# Patient Record
Sex: MALE | Race: AMERICAN INDIAN OR ALASKA NATIVE
[De-identification: names, ages, dates, MRNs, and addresses within clinical notes are randomized per-mention and may not be internally consistent; named-entity substitution may affect disease eponyms.]

---

## 2022-09-08 ENCOUNTER — HOSPITAL ENCOUNTER (INPATIENT)
Dept: HOSPITAL 5 - ED | Age: 32
LOS: 2 days | Discharge: HOME | DRG: 91 | End: 2022-09-10
Attending: STUDENT IN AN ORGANIZED HEALTH CARE EDUCATION/TRAINING PROGRAM | Admitting: HOSPITALIST
Payer: SELF-PAY

## 2022-09-08 DIAGNOSIS — F14.10: ICD-10-CM

## 2022-09-08 DIAGNOSIS — G92.8: Primary | ICD-10-CM

## 2022-09-08 DIAGNOSIS — E44.1: ICD-10-CM

## 2022-09-08 DIAGNOSIS — N17.0: ICD-10-CM

## 2022-09-08 LAB
ALBUMIN SERPL-MCNC: 3.5 G/DL (ref 3.9–5)
ALT SERPL-CCNC: 113 UNITS/L (ref 7–56)
BASOPHILS # (AUTO): 0 K/MM3 (ref 0–0.1)
BASOPHILS NFR BLD AUTO: 0.3 % (ref 0–1.8)
BUN SERPL-MCNC: 18 MG/DL (ref 9–20)
BUN/CREAT SERPL: 13 %
CALCIUM SERPL-MCNC: 8.4 MG/DL (ref 8.4–10.2)
EOSINOPHIL # BLD AUTO: 0 K/MM3 (ref 0–0.4)
EOSINOPHIL NFR BLD AUTO: 0 % (ref 0–4.3)
HCT VFR BLD CALC: 40.6 % (ref 35.5–45.6)
HEMOLYSIS INDEX: 29
HGB BLD-MCNC: 13.8 GM/DL (ref 11.8–15.2)
LYMPHOCYTES # BLD AUTO: 0.9 K/MM3 (ref 1.2–5.4)
LYMPHOCYTES NFR BLD AUTO: 4.9 % (ref 13.4–35)
MCHC RBC AUTO-ENTMCNC: 34 % (ref 32–34)
MCV RBC AUTO: 91 FL (ref 84–94)
MONOCYTES # (AUTO): 1.3 K/MM3 (ref 0–0.8)
MONOCYTES % (AUTO): 7.2 % (ref 0–7.3)
PLATELET # BLD: 138 K/MM3 (ref 140–440)
RBC # BLD AUTO: 4.47 M/MM3 (ref 3.65–5.03)

## 2022-09-08 PROCEDURE — 62270 DX LMBR SPI PNXR: CPT

## 2022-09-08 PROCEDURE — 70544 MR ANGIOGRAPHY HEAD W/O DYE: CPT

## 2022-09-08 PROCEDURE — 70551 MRI BRAIN STEM W/O DYE: CPT

## 2022-09-08 PROCEDURE — 82140 ASSAY OF AMMONIA: CPT

## 2022-09-08 PROCEDURE — 36415 COLL VENOUS BLD VENIPUNCTURE: CPT

## 2022-09-08 PROCEDURE — 85610 PROTHROMBIN TIME: CPT

## 2022-09-08 PROCEDURE — 96374 THER/PROPH/DIAG INJ IV PUSH: CPT

## 2022-09-08 PROCEDURE — 80053 COMPREHEN METABOLIC PANEL: CPT

## 2022-09-08 PROCEDURE — 82962 GLUCOSE BLOOD TEST: CPT

## 2022-09-08 PROCEDURE — 89051 BODY FLUID CELL COUNT: CPT

## 2022-09-08 PROCEDURE — 62328 DX LMBR SPI PNXR W/FLUOR/CT: CPT

## 2022-09-08 PROCEDURE — 87040 BLOOD CULTURE FOR BACTERIA: CPT

## 2022-09-08 PROCEDURE — G0378 HOSPITAL OBSERVATION PER HR: HCPCS

## 2022-09-08 PROCEDURE — 80061 LIPID PANEL: CPT

## 2022-09-08 PROCEDURE — 80048 BASIC METABOLIC PNL TOTAL CA: CPT

## 2022-09-08 PROCEDURE — 85025 COMPLETE CBC W/AUTO DIFF WBC: CPT

## 2022-09-08 PROCEDURE — 70450 CT HEAD/BRAIN W/O DYE: CPT

## 2022-09-08 PROCEDURE — 82947 ASSAY GLUCOSE BLOOD QUANT: CPT

## 2022-09-08 PROCEDURE — 99285 EMERGENCY DEPT VISIT HI MDM: CPT

## 2022-09-08 PROCEDURE — 84160 ASSAY OF PROTEIN ANY SOURCE: CPT

## 2022-09-08 PROCEDURE — 87116 MYCOBACTERIA CULTURE: CPT

## 2022-09-08 NOTE — EMERGENCY DEPARTMENT REPORT
ED General Adult HPI





- General


Chief complaint: Overdose


Stated complaint: OD


Time Seen by Provider: 09/08/22 16:29


Source: patient, EMS


Mode of arrival: Stretcher


Limitations: No Limitations





- History of Present Illness


Initial comments: 





Patient presents emergency department via EMS for unresponsiveness.  The patient

was found unresponsive by bystanders.  EMS gave the patient 0.4 mg of Narcan and

the patient was arousable.  Patient states that he believes his cocaine was 

mixed with something.


-: unknown


Severity scale (0 -10): 0


Consistency: now resolved


Improves with: none


Worsens with: none


Associated Symptoms: denies other symptoms


Treatments Prior to Arrival: none





- Related Data


                                    Allergies











Allergy/AdvReac Type Severity Reaction Status Date / Time


 


No Known Allergies Allergy   Unverified 09/08/22 16:23














ED Review of Systems


ROS: 


Stated complaint: OD


Other details as noted in HPI





Constitutional: denies: chills, fever


Eyes: denies: eye pain, eye discharge, vision change


ENT: denies: ear pain, throat pain


Respiratory: denies: cough, shortness of breath, wheezing


Cardiovascular: denies: chest pain, palpitations


Endocrine: no symptoms reported


Gastrointestinal: denies: abdominal pain, nausea, diarrhea


Genitourinary: denies: urgency, dysuria


Musculoskeletal: denies: back pain, joint swelling, arthralgia


Skin: denies: rash, lesions


Neurological: denies: headache, weakness, paresthesias


Psychiatric: denies: anxiety, depression


Hematological/Lymphatic: denies: easy bleeding, easy bruising





ED Physical Exam





- General


Limitations: No Limitations


General appearance: in no apparent distress, other (Somnolent but easily 

arousable)





- Head


Head exam: Present: atraumatic, normocephalic





- Eye


Eye exam: Present: normal appearance





- ENT


ENT exam: Present: mucous membranes moist





- Neck


Neck exam: Present: normal inspection





- Respiratory


Respiratory exam: Present: normal lung sounds bilaterally.  Absent: respiratory 

distress





- Cardiovascular


Cardiovascular Exam: Present: regular rate, normal rhythm.  Absent: systolic 

murmur, diastolic murmur, rubs, gallop





- GI/Abdominal


GI/Abdominal exam: Present: soft, normal bowel sounds.  Absent: distended, 

tenderness





- Rectal


Rectal exam: Present: deferred





- Extremities Exam


Extremities exam: Present: normal inspection





- Back Exam


Back exam: Present: normal inspection





- Neurological Exam


Neurological exam: Present: alert, oriented X3, CN II-XII intact, other (13/15).

 Absent: motor sensory deficit





- Psychiatric


Psychiatric exam: Present: normal affect, normal mood





- Skin


Skin exam: Present: warm, dry, intact, normal color.  Absent: rash





ED Course


                                   Vital Signs











  09/08/22 09/08/22 09/08/22





  16:13 16:41 16:45


 


Temperature 98.2 F  


 


Pulse Rate 95 H  84


 


Respiratory 14  11 L





Rate   


 


Blood Pressure   


 


Blood Pressure 116/79  





[Left]   


 


O2 Sat by Pulse 98 96 95





Oximetry   














  09/08/22 09/08/22 09/08/22





  17:01 17:15 17:31


 


Temperature   


 


Pulse Rate 88 84 86


 


Respiratory 15 8 L 9 L





Rate   


 


Blood Pressure  98/58 98/58


 


Blood Pressure   





[Left]   


 


O2 Sat by Pulse 95 96 96





Oximetry   














  09/08/22 09/08/22 09/08/22





  17:45 18:01 18:15


 


Temperature   


 


Pulse Rate 85 86 91 H


 


Respiratory 12 10 L 12





Rate   


 


Blood Pressure 103/61 103/61 104/63


 


Blood Pressure   





[Left]   


 


O2 Sat by Pulse 96 95 98





Oximetry   














  09/08/22 09/08/22 09/08/22





  18:31 18:45 19:00


 


Temperature   


 


Pulse Rate 81 84 85


 


Respiratory 14 13 14





Rate   


 


Blood Pressure 104/63 106/70 104/63


 


Blood Pressure   





[Left]   


 


O2 Sat by Pulse 100 97 99





Oximetry   














  09/08/22 09/08/22 09/08/22





  19:15 19:31 19:45


 


Temperature   


 


Pulse Rate 82 82 82


 


Respiratory 11 L 12 11 L





Rate   


 


Blood Pressure 101/63 101/63 110/63


 


Blood Pressure   





[Left]   


 


O2 Sat by Pulse 96 95 95





Oximetry   














  09/08/22 09/08/22





  20:01 20:15


 


Temperature  


 


Pulse Rate 82 79


 


Respiratory 18 11 L





Rate  


 


Blood Pressure 110/63 


 


Blood Pressure  





[Left]  


 


O2 Sat by Pulse 97 95





Oximetry  














ED Medical Decision Making





- Lab Data


Result diagrams: 


                                 09/08/22 19:56





                                 09/08/22 19:56








                                   Lab Results











  09/08/22 09/08/22 Range/Units





  19:56 19:56 


 


WBC  18.5 H   (4.5-11.0)  K/mm3


 


RBC  4.47   (3.65-5.03)  M/mm3


 


Hgb  13.8   (11.8-15.2)  gm/dl


 


Hct  40.6   (35.5-45.6)  %


 


MCV  91   (84-94)  fl


 


MCH  31   (28-32)  pg


 


MCHC  34   (32-34)  %


 


RDW  12.4 L   (13.2-15.2)  %


 


Plt Count  138 L   (140-440)  K/mm3


 


Lymph % (Auto)  4.9 L   (13.4-35.0)  %


 


Mono % (Auto)  7.2   (0.0-7.3)  %


 


Eos % (Auto)  0.0   (0.0-4.3)  %


 


Baso % (Auto)  0.3   (0.0-1.8)  %


 


Lymph # (Auto)  0.9 L   (1.2-5.4)  K/mm3


 


Mono # (Auto)  1.3 H   (0.0-0.8)  K/mm3


 


Eos # (Auto)  0.0   (0.0-0.4)  K/mm3


 


Baso # (Auto)  0.0   (0.0-0.1)  K/mm3


 


Seg Neutrophils %  87.6 H   (40.0-70.0)  %


 


Seg Neutrophils #  16.2 H   (1.8-7.7)  K/mm3


 


Sodium   138  (137-145)  mmol/L


 


Potassium   4.6  (3.6-5.0)  mmol/L


 


Chloride   102.9  ()  mmol/L


 


Carbon Dioxide   23  (22-30)  mmol/L


 


Anion Gap   17  mmol/L


 


BUN   18  (9-20)  mg/dL


 


Creatinine   1.4 H  (0.8-1.3)  mg/dL


 


Estimated GFR   > 60  ml/min


 


BUN/Creatinine Ratio   13  %


 


Glucose   108 H  ()  mg/dL


 


Calcium   8.4  (8.4-10.2)  mg/dL


 


Total Bilirubin   1.10  (0.1-1.2)  mg/dL


 


AST   219 H  (5-40)  units/L


 


ALT   113 H  (7-56)  units/L


 


Alkaline Phosphatase   71  ()  units/L


 


Total Protein   5.3 L  (6.3-8.2)  g/dL


 


Albumin   3.5 L  (3.9-5)  g/dL


 


Albumin/Globulin Ratio   1.9  %














- Radiology Data


Radiology results: report reviewed





- Medical Decision Making





Spoke to the neuro intensivist at [facility], Dr. Kamara, and the patient CT was 

discussed in detail


So that is less likely that the patient has an anoxic injury due to his 

stability to correspond upon awakening


Discussed doing a infectious work-up and to admit the patient to our facility 

for MRI with and without contrast


The above-mentioned neurochecks was felt the patient did not need to be acutely 

transferred out to their facility


Spoke to Dr. Cortez but the patient and the plan of care


Patient will be admitted to the hospitalist with MRI planned for the morning


Patient will also get lumbar puncture via IR in the a.m.


Reevaluation of the patient at 10:05 PM he is alert oriented x3 with a GCS of 

14/15


Critical care attestation.: 


If time is entered above; I have spent that time in minutes in the direct care 

of this critically ill patient, excluding procedure time.








ED Disposition


Clinical Impression: 


 AMS (altered mental status)





Disposition: 09 ADMITTED AS INPATIENT


Is pt being admited?: Yes


Does the pt Need Aspirin: No


Condition: Fair

## 2022-09-08 NOTE — HISTORY AND PHYSICAL REPORT
History of Present Illness


Date of examination: 09/08/22


Date of admission: 


09/08/22


Chief complaint: 





Altered mental status


Drug overdose


History of present illness: 





31 years old male with history of cocaine abuse was brought to the emergency 

department via EMS for unresponsiveness.  The patient was found unresponsive by 

bystanders.  EMS gave the patient 0.4 mg of Narcan and the patient was 

arousable.  Patient states that he believes his cocaine was mixed with so

mething.





In the emergency room initial CT scan of the head shows decreased attenuation 

seen in the right globus pallidus regions bilaterally.  Findings are suggestive 

of an anoxic episode.  Subsequently ER physician Spoke to the neuro intensivist 

at [facility], Dr. Kamara, and the patient CT was discussed in detail


So that is less likely that the patient has an anoxic injury due to his 

stability to correspond upon awakening


Discussed doing a infectious work-up and to admit the patient to our facility 

for MRI with and without contrast


The above-mentioned neurochecks was felt the patient did not need to be acutely 

transferred out to their facility


Spoke to Dr. Cortez but the patient and the plan of care


Patient will be admitted to the hospitalist with MRI planned for the morning


Patient will also get lumbar puncture via IR in the a.m.


Reevaluation of the patient at 10:05 PM he is alert oriented x3 with a GCS of 

14/15





Past History


Past Medical History: other


Past Surgical History: No surgical history (Cocaine abuse)


Social history: other (Cocaine abuse)


Family history: no significant family history





Medications and Allergies


                                    Allergies











Allergy/AdvReac Type Severity Reaction Status Date / Time


 


No Known Allergies Allergy   Verified 09/08/22 22:24











Active Meds: 


Active Medications





Acetaminophen (Acetaminophen 325 Mg Tab)  650 mg PO Q4H PRN


   PRN Reason: Pain MILD(1-3)/Fever >100.5/HA


Ibuprofen (Ibuprofen 600 Mg Tab)  600 mg PO Q6H PRN


   PRN Reason: Pain, Mild (1-3)


Morphine Sulfate (Morphine 2 Mg/1 Ml Inj)  2 mg IV Q4H PRN


   PRN Reason: Pain, Moderate (4-6)


Ondansetron HCl (Ondansetron 4 Mg/2 Ml Inj)  4 mg IV Q8H PRN


   PRN Reason: Nausea And Vomiting


Sodium Chloride (Sodium Chloride 0.9% 10 Ml Flush Syringe)  10 ml IV BID CHARANJIT


Sodium Chloride (Sodium Chloride 0.9% 10 Ml Flush Syringe)  10 ml IV PRN PRN


   PRN Reason: LINE FLUSH











Review of Systems


All systems: negative


Constitutional: fatigue, malaise, lethargy





Exam





- Constitutional


Vitals: 


                                        











Temp Pulse Resp BP Pulse Ox


 


 98.2 F   79   11 L  110/63   95 


 


 09/08/22 16:13  09/08/22 20:15  09/08/22 20:15  09/08/22 20:01  09/08/22 20:15











General appearance: Present: no acute distress, well-nourished





- EENT


Eyes: Present: PERRL


ENT: hearing intact, clear oral mucosa





- Neck


Neck: Present: supple, normal ROM





- Respiratory


Respiratory effort: normal


Respiratory: bilateral: CTA





- Cardiovascular


Heart Sounds: Present: S1 & S2.  Absent: rub, click





- Extremities


Extremities: pulses symmetrical, No edema


Peripheral Pulses: within normal limits





- Abdominal


General gastrointestinal: Present: soft, non-tender, non-distended, normal bowel

sounds


Male genitourinary: Present: normal





- Integumentary


Integumentary: Present: clear, warm, dry





- Musculoskeletal


Musculoskeletal: gait normal, strength equal bilaterally





- Psychiatric


Psychiatric: appropriate mood/affect, intact judgment & insight





- Neurologic


Neurologic: CNII-XII intact, moves all extremities





Results





- Labs


CBC & Chem 7: 


                                 09/08/22 19:56





                                 09/08/22 19:56


Labs: 


                             Laboratory Last Values











WBC  18.5 K/mm3 (4.5-11.0)  H  09/08/22  19:56    


 


RBC  4.47 M/mm3 (3.65-5.03)   09/08/22  19:56    


 


Hgb  13.8 gm/dl (11.8-15.2)   09/08/22  19:56    


 


Hct  40.6 % (35.5-45.6)   09/08/22  19:56    


 


MCV  91 fl (84-94)   09/08/22  19:56    


 


MCH  31 pg (28-32)   09/08/22  19:56    


 


MCHC  34 % (32-34)   09/08/22  19:56    


 


RDW  12.4 % (13.2-15.2)  L  09/08/22  19:56    


 


Plt Count  138 K/mm3 (140-440)  L  09/08/22  19:56    


 


Lymph % (Auto)  4.9 % (13.4-35.0)  L  09/08/22  19:56    


 


Mono % (Auto)  7.2 % (0.0-7.3)   09/08/22  19:56    


 


Eos % (Auto)  0.0 % (0.0-4.3)   09/08/22  19:56    


 


Baso % (Auto)  0.3 % (0.0-1.8)   09/08/22  19:56    


 


Lymph # (Auto)  0.9 K/mm3 (1.2-5.4)  L  09/08/22  19:56    


 


Mono # (Auto)  1.3 K/mm3 (0.0-0.8)  H  09/08/22  19:56    


 


Eos # (Auto)  0.0 K/mm3 (0.0-0.4)   09/08/22  19:56    


 


Baso # (Auto)  0.0 K/mm3 (0.0-0.1)   09/08/22  19:56    


 


Seg Neutrophils %  87.6 % (40.0-70.0)  H  09/08/22  19:56    


 


Seg Neutrophils #  16.2 K/mm3 (1.8-7.7)  H  09/08/22  19:56    


 


Sodium  138 mmol/L (137-145)   09/08/22  19:56    


 


Potassium  4.6 mmol/L (3.6-5.0)   09/08/22  19:56    


 


Chloride  102.9 mmol/L ()   09/08/22  19:56    


 


Carbon Dioxide  23 mmol/L (22-30)   09/08/22  19:56    


 


Anion Gap  17 mmol/L  09/08/22  19:56    


 


BUN  18 mg/dL (9-20)   09/08/22  19:56    


 


Creatinine  1.4 mg/dL (0.8-1.3)  H  09/08/22  19:56    


 


Estimated GFR  > 60 ml/min  09/08/22  19:56    


 


BUN/Creatinine Ratio  13 %  09/08/22  19:56    


 


Glucose  108 mg/dL ()  H  09/08/22  19:56    


 


Lactic Acid  1.40 mmol/L (0.7-2.0)   09/08/22  21:33    


 


Calcium  8.4 mg/dL (8.4-10.2)   09/08/22  19:56    


 


Total Bilirubin  1.10 mg/dL (0.1-1.2)   09/08/22  19:56    


 


AST  219 units/L (5-40)  H  09/08/22  19:56    


 


ALT  113 units/L (7-56)  H  09/08/22  19:56    


 


Alkaline Phosphatase  71 units/L ()   09/08/22  19:56    


 


Total Protein  5.3 g/dL (6.3-8.2)  L  09/08/22  19:56    


 


Albumin  3.5 g/dL (3.9-5)  L  09/08/22  19:56    


 


Albumin/Globulin Ratio  1.9 %  09/08/22  19:56    














- Imaging and Cardiology


CT Scan - head: report reviewed





Assessment and Plan


VTE prophylaxis?: Mechanical


Plan of care discussed with patient/family: Yes





- Patient Problems


(1) Acute metabolic encephalopathy


Status: Acute   


Plan to address problem: 


Admit the patient to the medical telemetry.  NPO.  D5 half-normal saline at the 

rate of 100 cc/h.  Oxygen via nasal penetrator per minute.  We will do MRI of 

the brain and MRA of the brain and neck with and without contrast.  Neurology 

and critical care evaluation.  We also do LP in the morning as per ER physician.








(2) Anoxic brain injury


Status: Acute   


Plan to address problem: 


Oxygen via nasal penetrator per minute.  We will do MRI of the brain and MRA of 

the brain and neck with and without contrast.  Neurology and critical care 

evaluation.  We also do LP in the morning as per ER physician.








(3) Cocaine abuse


Status: Acute   


Plan to address problem: 


Patient counseled regarding quit taking drugs and cocaine.








(4) DVT prophylaxis


Status: Acute   


Plan to address problem: 


SCD for DVT prophylaxis.  Pepcid 20 mg IV twice a day for GI prophylaxis.  

Patient is a full code

## 2022-09-08 NOTE — EMERGENCY DEPARTMENT REPORT
ED General Adult HPI





- General


Chief complaint: Overdose


Stated complaint: OD


Time Seen by Provider: 09/08/22 16:29


Source: patient, EMS


Mode of arrival: Stretcher


Limitations: No Limitations





- History of Present Illness


Severity scale (0 -10): 0





- Related Data


                                    Allergies











Allergy/AdvReac Type Severity Reaction Status Date / Time


 


No Known Allergies Allergy   Unverified 09/08/22 16:23














ED Review of Systems


ROS: 


Stated complaint: OD


Other details as noted in HPI





Comment: All other systems reviewed and negative


Constitutional: denies: chills, fever


Eyes: denies: eye pain, eye discharge, vision change


ENT: denies: ear pain, throat pain


Respiratory: denies: cough, shortness of breath, wheezing


Cardiovascular: denies: chest pain, palpitations


Endocrine: no symptoms reported


Gastrointestinal: denies: abdominal pain, nausea, diarrhea


Genitourinary: denies: urgency, dysuria


Musculoskeletal: denies: back pain, joint swelling, arthralgia


Skin: denies: rash, lesions


Neurological: denies: headache, weakness, paresthesias


Psychiatric: denies: anxiety, depression


Hematological/Lymphatic: denies: easy bleeding, easy bruising





ED Physical Exam





- General


Limitations: No Limitations





ED Course





                                   Vital Signs











  09/08/22





  16:13


 


Temperature 98.2 F


 


Pulse Rate 95 H


 


Respiratory 14





Rate 


 


Blood Pressure 116/79





[Left] 


 


O2 Sat by Pulse 98





Oximetry 











Critical care attestation.: 


If time is entered above; I have spent that time in minutes in the direct care 

of this critically ill patient, excluding procedure time.








ED Disposition


Condition: Stable

## 2022-09-08 NOTE — CAT SCAN REPORT
CT head/brain wo con



INDICATION / CLINICAL INFORMATION:

31 years Male; unresponsive.



TECHNIQUE: Routine CT head without contrast. All CT scans at this location are performed using CT dos
e reduction for ALARA by means of automated exposure control.



COMPARISON: 

None.



FINDINGS:



BRAIN / INTRACRANIAL CONTENTS: Decreased attenuation seen in the globus pallidus regions bilaterally.
 Findings are suggestive of anoxic episode. Please clinically correlate.



 Otherwise, no acute hemorrhage, mass effect, midline shift, hydrocephalus, or acute, large territori
al infarct. No signs of significant atrophy or chronic infarct. No significant white matter abnormali
ty seen.



CRANIOCERVICAL JUNCTION: No significant abnormality.



ORBITS: No significant abnormality of visualized orbits.

SINUSES / MASTOIDS: Small air-fluid levels seen in both maxillary antra. Mild to moderate mucosal thi
ckening seen in the ethmoids.



ADDITIONAL FINDINGS: None. 



IMPRESSION:

1. Findings worrisome for anoxic episode, as described above.

2. Otherwise, no focal mass, hemorrhage, hydrocephalus, or acute, large infarct seen.

3. Sinus disease noted.



Signer Name: Sony Sullivan MD, III 

Signed: 9/8/2022 7:38 PM

[facility]station Name[email or web address]

## 2022-09-09 LAB
BASOPHILS # (AUTO): 0 K/MM3 (ref 0–0.1)
BASOPHILS NFR BLD AUTO: 0.1 % (ref 0–1.8)
BUN SERPL-MCNC: 14 MG/DL (ref 9–20)
BUN/CREAT SERPL: 13 %
CALCIUM SERPL-MCNC: 8.2 MG/DL (ref 8.4–10.2)
EOSINOPHIL # BLD AUTO: 0 K/MM3 (ref 0–0.4)
EOSINOPHIL NFR BLD AUTO: 0.1 % (ref 0–4.3)
GLUCOSE CSF-MCNC: 69 MG/DL
HCT VFR BLD CALC: 37.8 % (ref 35.5–45.6)
HDLC SERPL-MCNC: 43 MG/DL (ref 40–59)
HEMOLYSIS INDEX: 10
HGB BLD-MCNC: 12.9 GM/DL (ref 11.8–15.2)
INR PPP: 0.95 (ref 0.87–1.13)
LYMPHOCYTES # BLD AUTO: 0.8 K/MM3 (ref 1.2–5.4)
LYMPHOCYTES NFR BLD AUTO: 7.3 % (ref 13.4–35)
MCHC RBC AUTO-ENTMCNC: 34 % (ref 32–34)
MCV RBC AUTO: 92 FL (ref 84–94)
MONOCYTES # (AUTO): 0.8 K/MM3 (ref 0–0.8)
MONOCYTES % (AUTO): 7.6 % (ref 0–7.3)
PLATELET # BLD: 131 K/MM3 (ref 140–440)
RBC # BLD AUTO: 4.12 M/MM3 (ref 3.65–5.03)
RED BLOOD CELL,CSF: 0 /MM3 (ref 0–0)
TOTAL CELLS COUNTED: 3 /MM3
WBC # CSF: 0 /MM3 (ref 1–10)

## 2022-09-09 PROCEDURE — 009U3ZX DRAINAGE OF SPINAL CANAL, PERCUTANEOUS APPROACH, DIAGNOSTIC: ICD-10-PCS

## 2022-09-09 PROCEDURE — B01B1ZZ FLUOROSCOPY OF SPINAL CORD USING LOW OSMOLAR CONTRAST: ICD-10-PCS

## 2022-09-09 RX ADMIN — FAMOTIDINE SCH MG: 10 INJECTION, SOLUTION INTRAVENOUS at 22:53

## 2022-09-09 RX ADMIN — FAMOTIDINE SCH MG: 10 INJECTION, SOLUTION INTRAVENOUS at 12:50

## 2022-09-09 RX ADMIN — ASPIRIN SCH MG: 325 TABLET ORAL at 12:51

## 2022-09-09 RX ADMIN — Medication SCH ML: at 12:51

## 2022-09-09 RX ADMIN — Medication SCH ML: at 22:54

## 2022-09-09 NOTE — CONSULTATION
History of Present Illness


Consult date: 09/09/22


Reason for Consult: AMS


Chief complaint: 





AMS


History of present illness: 





30 yo male with substance abuse, noted with acute encephalopathy / unresponsive 

and found by bystanders.  He was given narcan and noted to be arousable.  He 

thinks the cocaine was laced.  Noted on admission nchct with bilateral globus 

pallidus hypodensities, which does not match his exam.





**Patient is not in the room during rounds but is at MRI.





Past History


Past Medical History: other (substance abuse)


Past Surgical History: No surgical history (Cocaine abuse)


Social history: other (Cocaine abuse)


Family history: no significant family history





Medications and Allergies


                                    Allergies











Allergy/AdvReac Type Severity Reaction Status Date / Time


 


No Known Allergies Allergy   Verified 09/08/22 22:24











                                Home Medications











 Medication  Instructions  Recorded  Confirmed  Last Taken  Type


 


No Known Home Medications [No  09/09/22 09/09/22 Unknown History





Reported Home Medications]     











Active Meds: 


Active Medications





Acetaminophen (Acetaminophen 325 Mg Tab)  650 mg PO Q4H PRN


   PRN Reason: Pain MILD(1-3)/Fever >100.5/HA


Albuterol (Albuterol 2.5 Mg/3 Ml Nebu)  2.5 mg IH Q3HRT PRN


   PRN Reason: Shortness Of Breath


Aspirin (Aspirin 325 Mg Tab)  325 mg PO QDAY [facility]


   Last Admin: 09/09/22 12:51 Dose:  325 mg


   


Famotidine (Famotidine 20 Mg/2 Ml Inj)  20 mg IV BID [facility]


   Last Admin: 09/09/22 12:50 Dose:  20 mg


   


Ibuprofen (Ibuprofen 600 Mg Tab)  600 mg PO Q6H PRN


   PRN Reason: Pain, Mild (1-3)


Morphine Sulfate (Morphine 2 Mg/1 Ml Inj)  2 mg IV Q4H PRN


   PRN Reason: Pain, Moderate (4-6)


Morphine Sulfate (Morphine 4 Mg/1 Ml Inj)  4 mg IV Q4H PRN


   PRN Reason: Pain , Severe (7-10)


Ondansetron HCl (Ondansetron 4 Mg/2 Ml Inj)  4 mg IV Q8H PRN


   PRN Reason: Nausea And Vomiting


Sodium Chloride (Sodium Chloride 0.9% 10 Ml Flush Syringe)  10 ml IV BID [facility]


   Last Admin: 09/09/22 12:51 Dose:  10 ml


   


Sodium Chloride (Sodium Chloride 0.9% 10 Ml Flush Syringe)  10 ml IV PRN PRN


   PRN Reason: LINE FLUSH











Physical Examination





- Vital Signs


Vital Signs: 


                                   Vital Signs











Temp Pulse Resp BP Pulse Ox


 


 98.2 F   95 H  14   116/79   98 


 


 09/08/22 16:13  09/08/22 16:13  09/08/22 16:13  09/08/22 16:13  09/08/22 16:13














- Physical Exam


Narrative exam: 





Patient is not in room during rounds but he is at MRI.





Results





- Laboratory Findings


CBC and BMP: 


                                 09/09/22 15:54





                                 09/09/22 15:54


Abnormal Lab Findings: 


                                  Abnormal Labs











  09/08/22 09/08/22 09/09/22





  19:56 19:56 15:54


 


WBC  18.5 H  


 


RDW  12.4 L   12.4 L


 


Plt Count  138 L   131 L


 


Lymph % (Auto)  4.9 L   7.3 L


 


Mono % (Auto)    7.6 H


 


Lymph # (Auto)  0.9 L   0.8 L


 


Mono # (Auto)  1.3 H  


 


Seg Neutrophils %  87.6 H   84.9 H


 


Seg Neutrophils #  16.2 H   9.2 H


 


Sodium   


 


Carbon Dioxide   


 


Creatinine   1.4 H 


 


Glucose   108 H 


 


Calcium   


 


AST   219 H 


 


ALT   113 H 


 


Total Protein   5.3 L 


 


Albumin   3.5 L 














  09/09/22





  15:54


 


WBC 


 


RDW 


 


Plt Count 


 


Lymph % (Auto) 


 


Mono % (Auto) 


 


Lymph # (Auto) 


 


Mono # (Auto) 


 


Seg Neutrophils % 


 


Seg Neutrophils # 


 


Sodium  136 L


 


Carbon Dioxide  31 H D


 


Creatinine 


 


Glucose  131 H


 


Calcium  8.2 L


 


AST 


 


ALT 


 


Total Protein 


 


Albumin 














Assessment and Plan





30 yo male with substance abuse, noted with bilateral globus pallidus 

hypodensities.





1.  Acute Toxic Encephalopathy - secondary to substance (cocaine+) abuse; 

bilateral globus pallidus hypodensities likely represent toxic/metabolic injury,

seen sometimes in the setting of substance abuse.





2.  Substance Abuse - outpatient detox/rehab program highly recommended; high 

risk of seizures / strokes / cardiac / metabolic / toxic events.





Mathew Steinberg MD


Neurology

## 2022-09-09 NOTE — PROGRESS NOTE
Assessment and Plan


Assessment and plan: 








#Acute metabolic encephalopathyresolved


#Mild anoxic brain injury


CT head noncontrast (9/8/2022) revealing "findings worrisome for anoxic 

episode; decreased attenuation seen in the globus pallidus regions bilaterally. 

Findings are suggestive of an anoxic episode.  Please clinically correlate.  

Otherwise, no focal mass, hemorrhage, hydrocephalus, or acute large infarct 

seen."


MRI brain without contrast (9/9/2022) revealing "diffusion restriction is 

identified in the globus palate bilaterally; this could be secondary to hypoxic 

ischemic encephalopathy, toxic encephalopathy, or metabolic encephalopathy.  

Cocaine encephalopathy could be considered.


Lumbar puncture performed (9/9/2022): Glucose, total protein, cell count 

differential


Ordering PT/OT to evaluate patient status post anoxic brain injury


Pending UDS





#Mild protein caloric malnutrition


 Albumin 3.5


 Starting dietary supplementation





#Tobacco dependence


#Tobacco/Smoking cessation counseling


- Counseled patient about the importance of smoking cessation and the possible 

sequelae as a result of continued tobacco consumption. The patient expresses 

understanding. 


-Time: +15 mins





#Advanced care planning


-Disease education conducted, care plan discussed, diagnoses discussed, 

prognosis discussed, and patient acknowledges understanding with care plan


-Time: +30 min


Disposition Plan: Continue medical management


Total Time Spent with Patient (Minutes): 45 minutes





History


Interval history: 





No acute events overnight.





Hospitalist Physical





- Constitutional


Vitals: 


                                        











Temp Pulse Resp BP Pulse Ox


 


 98.3 F   66   20   110/57   95 


 


 09/09/22 05:21  09/09/22 05:21  09/09/22 05:21  09/09/22 05:21  09/09/22 05:21











General appearance: Present: no acute distress, well-nourished





- EENT


Eyes: Present: PERRL, EOM intact


ENT: hearing intact, clear oral mucosa, dentition normal





- Neck


Neck: Present: supple, normal ROM





- Respiratory


Respiratory effort: normal


Respiratory: bilateral: CTA





- Cardiovascular


Rhythm: regular


Heart Sounds: Present: S1 & S2





- Extremities


Extremities: no ischemia, pulses intact, pulses symmetrical, No edema, normal 

temperature, normal color


Peripheral Pulses: within normal limits





- Abdominal


General gastrointestinal: soft, non-tender, non-distended, normal bowel sounds





- Integumentary


Integumentary: Present: clear, warm, dry





- Psychiatric


Psychiatric: appropriate mood/affect, cooperative





- Neurologic


Neurologic: CNII-XII intact, moves all extremities





- Allied Health


Allied health notes reviewed: nursing





Results





- Labs


CBC & Chem 7: 


                                 09/08/22 19:56





                                 09/08/22 19:56


Labs: 


                             Laboratory Last Values











WBC  18.5 K/mm3 (4.5-11.0)  H  09/08/22  19:56    


 


RBC  4.47 M/mm3 (3.65-5.03)   09/08/22  19:56    


 


Hgb  13.8 gm/dl (11.8-15.2)   09/08/22  19:56    


 


Hct  40.6 % (35.5-45.6)   09/08/22  19:56    


 


MCV  91 fl (84-94)   09/08/22  19:56    


 


MCH  31 pg (28-32)   09/08/22  19:56    


 


MCHC  34 % (32-34)   09/08/22  19:56    


 


RDW  12.4 % (13.2-15.2)  L  09/08/22  19:56    


 


Plt Count  138 K/mm3 (140-440)  L  09/08/22  19:56    


 


Lymph % (Auto)  4.9 % (13.4-35.0)  L  09/08/22  19:56    


 


Mono % (Auto)  7.2 % (0.0-7.3)   09/08/22  19:56    


 


Eos % (Auto)  0.0 % (0.0-4.3)   09/08/22  19:56    


 


Baso % (Auto)  0.3 % (0.0-1.8)   09/08/22  19:56    


 


Lymph # (Auto)  0.9 K/mm3 (1.2-5.4)  L  09/08/22  19:56    


 


Mono # (Auto)  1.3 K/mm3 (0.0-0.8)  H  09/08/22  19:56    


 


Eos # (Auto)  0.0 K/mm3 (0.0-0.4)   09/08/22  19:56    


 


Baso # (Auto)  0.0 K/mm3 (0.0-0.1)   09/08/22  19:56    


 


Seg Neutrophils %  87.6 % (40.0-70.0)  H  09/08/22  19:56    


 


Seg Neutrophils #  16.2 K/mm3 (1.8-7.7)  H  09/08/22  19:56    


 


Sodium  138 mmol/L (137-145)   09/08/22  19:56    


 


Potassium  4.6 mmol/L (3.6-5.0)   09/08/22  19:56    


 


Chloride  102.9 mmol/L ()   09/08/22  19:56    


 


Carbon Dioxide  23 mmol/L (22-30)   09/08/22  19:56    


 


Anion Gap  17 mmol/L  09/08/22  19:56    


 


BUN  18 mg/dL (9-20)   09/08/22  19:56    


 


Creatinine  1.4 mg/dL (0.8-1.3)  H  09/08/22  19:56    


 


Estimated GFR  > 60 ml/min  09/08/22  19:56    


 


BUN/Creatinine Ratio  13 %  09/08/22  19:56    


 


Glucose  108 mg/dL ()  H  09/08/22  19:56    


 


Lactic Acid  1.40 mmol/L (0.7-2.0)   09/08/22  21:33    


 


Calcium  8.4 mg/dL (8.4-10.2)   09/08/22  19:56    


 


Total Bilirubin  1.10 mg/dL (0.1-1.2)   09/08/22  19:56    


 


AST  219 units/L (5-40)  H  09/08/22  19:56    


 


ALT  113 units/L (7-56)  H  09/08/22  19:56    


 


Alkaline Phosphatase  71 units/L ()   09/08/22  19:56    


 


Total Protein  5.3 g/dL (6.3-8.2)  L  09/08/22  19:56    


 


Albumin  3.5 g/dL (3.9-5)  L  09/08/22  19:56    


 


Albumin/Globulin Ratio  1.9 %  09/08/22  19:56    











Microbiology: 


Microbiology





09/08/22 21:33   Peripheral/Venous   Blood Culture - Preliminary


                            Culture in Progress


09/08/22 21:33   Peripheral/Venous   Blood Culture - Preliminary


                            Culture in Progress











Active Medications





- Current Medications


Current Medications: 














Generic Name Dose Route Start Last Admin





  Trade Name Freq  PRN Reason Stop Dose Admin


 


Acetaminophen  650 mg  09/08/22 22:47 





  Acetaminophen 325 Mg Tab  PO  





  Q4H PRN  





  Pain MILD(1-3)/Fever >100.5/HA  


 


Albuterol  2.5 mg  09/08/22 22:47 





  Albuterol 2.5 Mg/3 Ml Nebu  IH  





  Q3HRT PRN  





  Shortness Of Breath  


 


Aspirin  325 mg  09/09/22 10:00  09/09/22 12:51





  Aspirin 325 Mg Tab  PO   325 mg





  QDAY CHARANJIT   Administration


 


Famotidine  20 mg  09/09/22 10:00  09/09/22 12:50





  Famotidine 20 Mg/2 Ml Inj  IV   20 mg





  BID CHARANJIT   Administration


 


Ibuprofen  600 mg  09/08/22 22:18 





  Ibuprofen 600 Mg Tab  PO  





  Q6H PRN  





  Pain, Mild (1-3)  


 


Morphine Sulfate  2 mg  09/08/22 22:47 





  Morphine 2 Mg/1 Ml Inj  IV  





  Q4H PRN  





  Pain, Moderate (4-6)  


 


Morphine Sulfate  4 mg  09/08/22 22:47 





  Morphine 4 Mg/1 Ml Inj  IV  





  Q4H PRN  





  Pain , Severe (7-10)  


 


Ondansetron HCl  4 mg  09/08/22 22:47 





  Ondansetron 4 Mg/2 Ml Inj  IV  





  Q8H PRN  





  Nausea And Vomiting  


 


Sodium Chloride  10 ml  09/09/22 10:00  09/09/22 12:51





  Sodium Chloride 0.9% 10 Ml Flush Syringe  IV   10 ml





  BID CHARANJIT   Administration


 


Sodium Chloride  10 ml  09/08/22 22:47 





  Sodium Chloride 0.9% 10 Ml Flush Syringe  IV  





  PRN PRN  





  LINE FLUSH

## 2022-09-09 NOTE — FLUOROSCOPY REPORT
LUMBAR PUNCTURE



INDICATION : Altered mental status



PROCEDURE:  The risks (including but not limited to bleeding, infection, and spinal headache) and toro
efits were explained to the patient and informed consent was obtained.  A time out procedure was perf
ormed.  The procedure site was prepped and draped in the usual sterile fashion and lidocaine was used
 for local anesthesia.  



Under fluoroscopic guidance, a 22-gauge spinal needle was advanced into the L3-4 interlaminar space. 
The opening pressure was 200 mm H2O which was calculated by adding the length of the needle (9 cm) to
 the height of the CSF column. 4 separate collection tubes were used to obtain 2 mL's of CSF each Davis
ples were sent to the lab per the ordering physician specifications for further evaluation.



The patient tolerated the procedure well with no complications.



IMPRESSION: 

 Successful lumbar puncture as outlined above.

Opening pressure was 200 mm H20.



Fluoroscopic time:  0.8



Number of fluoroscopic images:   1





Signer Name: Ramone Guillen Jr, MD 

Signed: 9/9/2022 1:15 PM

Workstation Name: [id number]

## 2022-09-09 NOTE — PROCEDURE NOTE
Date of procedure: 09/09/22


Pre-op diagnosis: altered mental status


Post-op diagnosis: same


Procedure: 


flouro guided lumbar puncture


Findings: 


elevated CSF pressure 20 cm H20


Anesthesia: local


Surgeon: MCKAY PIERRE


Estimated blood loss: none


Pathology: list (4 csf tubes)


Specimen disposition: to lab


Condition: stable


Disposition: floor

## 2022-09-09 NOTE — MAGNETIC RESONANCE REPORT
MRI BRAIN WITHOUT CONTRAST 



INDICATION / CLINICAL INFORMATION:

stroke.



TECHNIQUE:

Multisequence, multiplanar images were obtained.



COMPARISON: 

None available.



FINDINGS:

CEREBRAL and CEREBELLAR HEMISPHERES: Symmetric diffusion restriction is identified in both basal gang
mee involving the globus pallidi bilaterally. No other areas of diffusion restriction are identified.
 There are punctate areas of petechial hemorrhage suggested on the gradient T2 images in both globus 
pallidi. No uncontained hemorrhage. There are scattered areas of T2 signal abnormalities in the periv
entricular and subcortical white matter bilaterally consistent with chronic microangiopathy. The reji
linn of the brain parenchyma demonstrates normal signal.  No midline shift.  No acute hemorrhage. No
 extra-axial fluid collection.

VENTRICLES: Normal in size and configuration for age.  

 

VISUALIZED ORBITS: No significant abnormality.

VISUALIZED PARANASAL SINUSES: Mild to moderate mucosal thickening is present throughout all paranasal
 sinuses. The mastoid air cells are clear.



ADDITIONAL FINDINGS: None.



IMPRESSION:

Diffusion restriction is identified in the globus pallidi bilaterally. This could be secondary to hyp
oxic-ischemic encephalopathy, toxic encephalopathy or metabolic encephalopathy. Cocaine encephalopath
y could be considered.





MRA HEAD



INDICATION / CLINICAL INFORMATION:

31 years Male; stroke.



TECHNIQUE: 3-D time of flight.  NASCET type criteria used to evaluate stenoses.



COMPARISON: 

None available.



FINDINGS:



INTERNAL CAROTID ARTERIES: No significant narrowing appreciated.

VERTEBROBASILAR SYSTEM: No significant narrowing appreciated. The left vertebral artery is dominant.



DISTAL BRANCHES: Distal branches of the anterior, middle, and posterior cerebral arteries are fairly 
symmetric in appearance and number.



ANEURYSM: None identified.



IMPRESSION:

No significant abnormality on this MRA of the brain. 



Signer Name: Ramone Guillen Jr, MD 

Signed: 9/9/2022 1:28 PM

Workstation Name: [id number]

## 2022-09-09 NOTE — MAGNETIC RESONANCE REPORT
MRI BRAIN WITHOUT CONTRAST 



INDICATION / CLINICAL INFORMATION:

stroke.



TECHNIQUE:

Multisequence, multiplanar images were obtained.



COMPARISON: 

None available.



FINDINGS:

CEREBRAL and CEREBELLAR HEMISPHERES: Symmetric diffusion restriction is identified in both basal gang
mee involving the globus pallidi bilaterally. No other areas of diffusion restriction are identified.
 There are punctate areas of petechial hemorrhage suggested on the gradient T2 images in both globus 
pallidi. No uncontained hemorrhage. There are scattered areas of T2 signal abnormalities in the periv
entricular and subcortical white matter bilaterally consistent with chronic microangiopathy. The reji
linn of the brain parenchyma demonstrates normal signal.  No midline shift.  No acute hemorrhage. No
 extra-axial fluid collection.

VENTRICLES: Normal in size and configuration for age.  

 

VISUALIZED ORBITS: No significant abnormality.

VISUALIZED PARANASAL SINUSES: Mild to moderate mucosal thickening is present throughout all paranasal
 sinuses. The mastoid air cells are clear.



ADDITIONAL FINDINGS: None.



IMPRESSION:

Diffusion restriction is identified in the globus pallidi bilaterally. This could be secondary to hyp
oxic-ischemic encephalopathy, toxic encephalopathy or metabolic encephalopathy. Cocaine encephalopath
y could be considered.





MRA HEAD



INDICATION / CLINICAL INFORMATION:

31 years Male; stroke.



TECHNIQUE: 3-D time of flight.  NASCET type criteria used to evaluate stenoses.



COMPARISON: 

None available.



FINDINGS:



INTERNAL CAROTID ARTERIES: No significant narrowing appreciated.

VERTEBROBASILAR SYSTEM: No significant narrowing appreciated. The left vertebral artery is dominant.



DISTAL BRANCHES: Distal branches of the anterior, middle, and posterior cerebral arteries are fairly 
symmetric in appearance and number.



ANEURYSM: None identified.



IMPRESSION:

No significant abnormality on this MRA of the brain. 



Signer Name: Ramone Guillen Jr, MD 

Signed: 9/9/2022 1:28 PM

Workstation Name: [id number]

## 2022-09-10 VITALS — SYSTOLIC BLOOD PRESSURE: 109 MMHG | DIASTOLIC BLOOD PRESSURE: 69 MMHG

## 2022-09-10 LAB
BASOPHILS # (AUTO): 0 K/MM3 (ref 0–0.1)
BASOPHILS NFR BLD AUTO: 0.2 % (ref 0–1.8)
BUN SERPL-MCNC: 10 MG/DL (ref 9–20)
BUN/CREAT SERPL: 10 %
CALCIUM SERPL-MCNC: 8.3 MG/DL (ref 8.4–10.2)
EOSINOPHIL # BLD AUTO: 0 K/MM3 (ref 0–0.4)
EOSINOPHIL NFR BLD AUTO: 0.3 % (ref 0–4.3)
HCT VFR BLD CALC: 38.2 % (ref 35.5–45.6)
HEMOLYSIS INDEX: 5
HGB BLD-MCNC: 12.5 GM/DL (ref 11.8–15.2)
LYMPHOCYTES # BLD AUTO: 1 K/MM3 (ref 1.2–5.4)
LYMPHOCYTES NFR BLD AUTO: 11.7 % (ref 13.4–35)
MCHC RBC AUTO-ENTMCNC: 33 % (ref 32–34)
MCV RBC AUTO: 92 FL (ref 84–94)
MONOCYTES # (AUTO): 0.9 K/MM3 (ref 0–0.8)
MONOCYTES % (AUTO): 11.1 % (ref 0–7.3)
PLATELET # BLD: 162 K/MM3 (ref 140–440)
RBC # BLD AUTO: 4.18 M/MM3 (ref 3.65–5.03)

## 2022-09-10 RX ADMIN — FAMOTIDINE SCH MG: 10 INJECTION, SOLUTION INTRAVENOUS at 09:56

## 2022-09-10 RX ADMIN — Medication SCH ML: at 09:56

## 2022-09-10 RX ADMIN — ASPIRIN SCH MG: 325 TABLET ORAL at 09:56

## 2022-09-10 NOTE — DISCHARGE SUMMARY
Providers





- Providers


Date of Admission: 


09/09/22 10:44





Date of discharge: 09/10/22


Attending physician: 


DEONTE SNYDER MD





                                        





09/08/22 22:47


Consult to Physician [CONS] Routine 


   Comment: 


   Consulting Provider: LEANN BRANDT


   Physician Instructions: 


   Reason For Exam: ams


Occupational Therapy Evaluate and Treat [CONS] Routine 


   Comment: 


   Reason For Exam: Neuro deficits


Physical Therapy Evaluation and Treat [CONS] Routine 


   Comment: 


   Reason For Exam: Neuro deficits











Primary care physician: 


RONA WORKMAN








Hospitalization


Reason for admission: Acute toxic metabolic encephalopathy, mild anoxic brain 

injury, PRATIBHA


Condition: Fair


Pertinent studies: 





Reviewed.


Procedures: 





Lumbar punctureunremarkable


Hospital course: 





The patient is a 31-year-old male with past medical history of polysubstance 

dependence (cocaine) who presented to the ED via EMS after being found 

unresponsive by bystanders.  In the scene, the patient was administered 0.4 mg 

of Narcan causing arousability.  The patient stated that he was snorting cocaine

 that was possibly "mixed with something".  In the ED, the patient was found to 

be hemodynamically stable.  The patient's labs are remarkable for creatinine 

1.4, WBC 18.5, platelets 138.  Patient underwent CT head noncontrast (9/8/2022) 

revealing "findings worrisome for anoxic episode; decreased attenuation seen in 

the globus pallidus regions bilaterally.  Findings are suggestive of an anoxic 

episode.  Please clinically correlate.  Otherwise, no focal mass, hemorrhage, 

hydrocephalus, or acute large infarct seen".  He then underwent MRI brain 

without contrast (9/9/2022) revealing "diffusion restriction is identified in 

the globus palate bilaterally; this could be secondary to hypoxic ischemic 

encephalopathy, toxic encephalopathy, or metabolic encephalopathy.  Cocaine 

encephalopathy could be considered".  Patient underwent lumbar puncture that was

 found to be unremarkable.  He was evaluated by physical therapy that deemed the

 patient safe to go home without any additional needs needed.  Patient's acute 

type metabolic encephalopathy has since resolved.  Patient was essentially 

diagnosed with mild anoxic brain injury without any obvious residual defects.  

Patient was counseled at length about the importance of cessation of illicit 

substances, the patient expresses understanding.  Patient is medically clear for

 discharge.


Disposition: 01 HOME / SELF CARE / HOMELESS


Final Discharge Diagnosis (Prints w/discharge instructions): Acute toxic 

metabolic encephalopathy, mild anoxic brain injury, PRATIBHA secondary to vasomotor, 

mild protein caloric malnutrition, tobacco dependence, polysubstance dependence.


Time spent for discharge: 45 min 





Core Measure Documentation





- Palliative Care


Palliative Care/ Comfort Measures: Not Applicable





- Core Measures


Any of the following diagnoses?: none





Exam





- Constitutional


Vitals: 


                                        











Temp Pulse Resp BP Pulse Ox


 


 98.3 F   63   18   115/71   94 


 


 09/10/22 04:56  09/10/22 04:56  09/10/22 04:56  09/10/22 04:56  09/10/22 04:56











General appearance: Present: no acute distress, well-nourished





- EENT


Eyes: Present: PERRL, EOM intact


ENT: hearing intact, clear oral mucosa, dentition normal





- Neck


Neck: Present: supple, normal ROM





- Respiratory


Respiratory effort: normal


Respiratory: bilateral: CTA





- Cardiovascular


Rhythm: regular


Heart Sounds: Present: S1 & S2





- Extremities


Extremities: no ischemia, pulses intact, pulses symmetrical, No edema, normal 

temperature, normal color, Full ROM


Peripheral Pulses: within normal limits





- Abdominal


General gastrointestinal: Present: soft, non-tender, non-distended, normal bowel

 sounds


Male genitourinary: Present: deferred





- Rectal


Rectal Exam: deferred





- Integumentary


Integumentary: Present: clear, warm, dry





- Musculoskeletal


Musculoskeletal: strength equal bilaterally





- Psychiatric


Psychiatric: appropriate mood/affect, intact judgment & insight, memory intact, 

cooperative





- Neurologic


Neurologic: CNII-XII intact, moves all extremities





- Allied Health


Allied health notes reviewed: nursing





Plan


Activity: no restrictions


Diet: regular


Additional Instructions: The patient is a 31-year-old male with past medical 

history of polysubstance dependence (cocaine) who presented to the ED via EMS 

after being found unresponsive by bystanders.  In the scene, the patient was 

administered 0.4 mg of Narcan causing arousability.  The patient stated that he 

was snorting cocaine that was possibly "mixed with something".  In the ED, the 

patient was found to be hemodynamically stable.  The patient's labs are 

remarkable for creatinine 1.4, WBC 18.5, platelets 138.  Patient underwent CT 

head noncontrast (9/8/2022) revealing "findings worrisome for anoxic episode; 

decreased attenuation seen in the globus pallidus regions bilaterally.  Findings

 are suggestive of an anoxic episode.  Please clinically correlate.  Otherwise, 

no focal mass, hemorrhage, hydrocephalus, or acute large infarct seen".  He then

 underwent MRI brain without contrast (9/9/2022) revealing "diffusion 

restriction is identified in the globus palate bilaterally; this could be 

secondary to hypoxic ischemic encephalopathy, toxic encephalopathy, or metabolic

 encephalopathy.  Cocaine encephalopathy could be considered".  Patient 

underwent lumbar puncture that was found to be unremarkable.  He was evaluated 

by physical therapy that deemed the patient safe to go home without any 

additional needs needed.  Patient's acute type metabolic encephalopathy has 

since resolved.  Patient was essentially diagnosed with mild anoxic brain injury

 without any obvious residual defects.  Patient was counseled at length about 

the importance of cessation of illicit substances, the patient expresses 

understanding.  Patient is medically clear for discharge.


Care Plan Goals: 


Patient is medically cleared for discharge.


Assessment: 


The patient is a 31-year-old male with past medical history of polysubstance 

dependence (cocaine) who presented to the ED via EMS after being found 

unresponsive by bystanders.  In the scene, the patient was administered 0.4 mg o

f Narcan causing arousability.  The patient stated that he was snorting cocaine 

that was possibly "mixed with something".  In the ED, the patient was found to 

be hemodynamically stable.  The patient's labs are remarkable for creatinine 

1.4, WBC 18.5, platelets 138.  Patient underwent CT head noncontrast (9/8/2022) 

revealing "findings worrisome for anoxic episode; decreased attenuation seen in 

the globus pallidus regions bilaterally.  Findings are suggestive of an anoxic 

episode.  Please clinically correlate.  Otherwise, no focal mass, hemorrhage, 

hydrocephalus, or acute large infarct seen".  He then underwent MRI brain 

without contrast (9/9/2022) revealing "diffusion restriction is identified in 

the globus palate bilaterally; this could be secondary to hypoxic ischemic 

encephalopathy, toxic encephalopathy, or metabolic encephalopathy.  Cocaine 

encephalopathy could be considered".  Patient underwent lumbar puncture that was

 found to be unremarkable.  He was evaluated by physical therapy that deemed the

 patient safe to go home without any additional needs needed.  Patient's acute 

type metabolic encephalopathy has since resolved.  Patient was essentially 

diagnosed with mild anoxic brain injury without any obvious residual defects.  

Patient was counseled at length about the importance of cessation of illicit 

substances, the patient expresses understanding.  Patient is medically clear for

 discharge.


Follow up with: 


KASSI CAVANAUGH MD [Staff Physician] - 14 Days